# Patient Record
Sex: MALE | Race: WHITE | Employment: OTHER | URBAN - METROPOLITAN AREA
[De-identification: names, ages, dates, MRNs, and addresses within clinical notes are randomized per-mention and may not be internally consistent; named-entity substitution may affect disease eponyms.]

---

## 2020-10-09 RX ORDER — INSULIN LISPRO 100 [IU]/ML
5 INJECTION, SOLUTION INTRAVENOUS; SUBCUTANEOUS 2 TIMES DAILY PRN
COMMUNITY

## 2020-10-09 RX ORDER — ATORVASTATIN CALCIUM 40 MG/1
40 TABLET, FILM COATED ORAL NIGHTLY
COMMUNITY
Start: 2019-12-20

## 2020-10-09 RX ORDER — TRIAMTERENE AND HYDROCHLOROTHIAZIDE 37.5; 25 MG/1; MG/1
1 CAPSULE ORAL DAILY
COMMUNITY
Start: 2019-10-22

## 2020-10-09 RX ORDER — CALCIUM CARBONATE 500(1250)
1000 TABLET ORAL 3 TIMES DAILY
COMMUNITY

## 2020-10-09 RX ORDER — ASPIRIN 81 MG/1
81 TABLET, CHEWABLE ORAL DAILY
COMMUNITY

## 2020-10-09 RX ORDER — M-VIT,TX,IRON,MINS/CALC/FOLIC 27MG-0.4MG
1 TABLET ORAL DAILY
COMMUNITY

## 2020-10-09 RX ORDER — FAMOTIDINE 40 MG/1
40 TABLET, FILM COATED ORAL DAILY
COMMUNITY
Start: 2020-07-28

## 2020-10-09 RX ORDER — LISINOPRIL 10 MG/1
10 TABLET ORAL NIGHTLY
COMMUNITY

## 2020-10-09 RX ORDER — CETIRIZINE HCL 10 MG/1
10 CAPSULE ORAL DAILY
COMMUNITY

## 2020-10-09 RX ORDER — ALBUTEROL SULFATE 90 UG/1
2 AEROSOL, METERED RESPIRATORY (INHALATION) PRN
COMMUNITY

## 2020-10-09 SDOH — HEALTH STABILITY: MENTAL HEALTH: HOW OFTEN DO YOU HAVE A DRINK CONTAINING ALCOHOL?: NEVER

## 2020-10-10 ENCOUNTER — HOSPITAL ENCOUNTER (OUTPATIENT)
Dept: PREADMISSION TESTING | Age: 65
Setting detail: SPECIMEN
Discharge: HOME | End: 2020-10-14
Payer: MEDICARE

## 2020-10-10 PROCEDURE — U0003 INFECTIOUS AGENT DETECTION BY NUCLEIC ACID (DNA OR RNA); SEVERE ACUTE RESPIRATORY SYNDROME CORONAVIRUS 2 (SARS-COV-2) (CORONAVIRUS DISEASE [COVID-19]), AMPLIFIED PROBE TECHNIQUE, MAKING USE OF HIGH THROUGHPUT TECHNOLOGIES AS DESCRIBED BY CMS-2020-01-R: HCPCS

## 2020-10-12 LAB — SARS-COV-2, NAA: NOT DETECTED

## 2020-10-13 ENCOUNTER — TELEPHONE (OUTPATIENT)
Dept: PRIMARY CARE CLINIC | Age: 65
End: 2020-10-13

## 2020-10-14 ENCOUNTER — HOSPITAL ENCOUNTER (OUTPATIENT)
Age: 65
Setting detail: OUTPATIENT SURGERY
Discharge: HOME | End: 2020-10-14
Attending: OPHTHALMOLOGY | Admitting: OPHTHALMOLOGY
Payer: MEDICARE

## 2020-10-14 ENCOUNTER — ANESTHESIA (OUTPATIENT)
Dept: OPERATING ROOM | Age: 65
End: 2020-10-14
Payer: MEDICARE

## 2020-10-14 ENCOUNTER — ANESTHESIA EVENT (OUTPATIENT)
Dept: OPERATING ROOM | Age: 65
End: 2020-10-14
Payer: MEDICARE

## 2020-10-14 VITALS
HEIGHT: 71 IN | TEMPERATURE: 97.3 F | WEIGHT: 275.57 LBS | RESPIRATION RATE: 20 BRPM | BODY MASS INDEX: 38.58 KG/M2 | OXYGEN SATURATION: 94 % | SYSTOLIC BLOOD PRESSURE: 159 MMHG | HEART RATE: 66 BPM | DIASTOLIC BLOOD PRESSURE: 50 MMHG

## 2020-10-14 VITALS — OXYGEN SATURATION: 96 % | SYSTOLIC BLOOD PRESSURE: 179 MMHG | DIASTOLIC BLOOD PRESSURE: 80 MMHG

## 2020-10-14 LAB
GFR NON-AFRICAN AMERICAN: >60 ML/MIN
GFR SERPL CREATININE-BSD FRML MDRD: >60 ML/MIN
GFR SERPL CREATININE-BSD FRML MDRD: NORMAL ML/MIN/{1.73_M2}
GLUCOSE BLD-MCNC: 188 MG/DL (ref 74–100)
POC CREATININE: 0.77 MG/DL (ref 0.51–1.19)
POC POTASSIUM: 3.7 MMOL/L (ref 3.5–4.5)

## 2020-10-14 PROCEDURE — 6370000000 HC RX 637 (ALT 250 FOR IP): Performed by: OPHTHALMOLOGY

## 2020-10-14 PROCEDURE — 82565 ASSAY OF CREATININE: CPT

## 2020-10-14 PROCEDURE — 3700000000 HC ANESTHESIA ATTENDED CARE: Performed by: OPHTHALMOLOGY

## 2020-10-14 PROCEDURE — 2709999900 HC NON-CHARGEABLE SUPPLY: Performed by: OPHTHALMOLOGY

## 2020-10-14 PROCEDURE — 2580000003 HC RX 258: Performed by: ANESTHESIOLOGY

## 2020-10-14 PROCEDURE — 3600000004 HC SURGERY LEVEL 4 BASE: Performed by: OPHTHALMOLOGY

## 2020-10-14 PROCEDURE — 82947 ASSAY GLUCOSE BLOOD QUANT: CPT

## 2020-10-14 PROCEDURE — 3700000001 HC ADD 15 MINUTES (ANESTHESIA): Performed by: OPHTHALMOLOGY

## 2020-10-14 PROCEDURE — 3600000015 HC SURGERY LEVEL 5 ADDTL 15MIN: Performed by: OPHTHALMOLOGY

## 2020-10-14 PROCEDURE — 7100000041 HC SPAR PHASE II RECOVERY - ADDTL 15 MIN: Performed by: OPHTHALMOLOGY

## 2020-10-14 PROCEDURE — 84132 ASSAY OF SERUM POTASSIUM: CPT

## 2020-10-14 PROCEDURE — 3600000005 HC SURGERY LEVEL 5 BASE: Performed by: OPHTHALMOLOGY

## 2020-10-14 PROCEDURE — 7100000040 HC SPAR PHASE II RECOVERY - FIRST 15 MIN: Performed by: OPHTHALMOLOGY

## 2020-10-14 PROCEDURE — 3600000014 HC SURGERY LEVEL 4 ADDTL 15MIN: Performed by: OPHTHALMOLOGY

## 2020-10-14 PROCEDURE — 93005 ELECTROCARDIOGRAM TRACING: CPT | Performed by: ANESTHESIOLOGY

## 2020-10-14 RX ORDER — POLYMYXIN B SULFATE AND TRIMETHOPRIM 1; 10000 MG/ML; [USP'U]/ML
1 SOLUTION OPHTHALMIC
Status: COMPLETED | OUTPATIENT
Start: 2020-10-14 | End: 2020-10-14

## 2020-10-14 RX ORDER — TROPICAMIDE 10 MG/ML
1 SOLUTION/ DROPS OPHTHALMIC
Status: COMPLETED | OUTPATIENT
Start: 2020-10-14 | End: 2020-10-14

## 2020-10-14 RX ORDER — PHENYLEPHRINE HYDROCHLORIDE 100 MG/ML
1 SOLUTION/ DROPS OPHTHALMIC
Status: COMPLETED | OUTPATIENT
Start: 2020-10-14 | End: 2020-10-14

## 2020-10-14 RX ORDER — CYCLOPENTOLATE HYDROCHLORIDE 10 MG/ML
1 SOLUTION/ DROPS OPHTHALMIC
Status: COMPLETED | OUTPATIENT
Start: 2020-10-14 | End: 2020-10-14

## 2020-10-14 RX ORDER — SODIUM CHLORIDE, SODIUM LACTATE, POTASSIUM CHLORIDE, CALCIUM CHLORIDE 600; 310; 30; 20 MG/100ML; MG/100ML; MG/100ML; MG/100ML
INJECTION, SOLUTION INTRAVENOUS CONTINUOUS
Status: DISCONTINUED | OUTPATIENT
Start: 2020-10-14 | End: 2020-10-14 | Stop reason: HOSPADM

## 2020-10-14 RX ADMIN — TROPICAMIDE 1 DROP: 10 SOLUTION/ DROPS OPHTHALMIC at 11:50

## 2020-10-14 RX ADMIN — POLYMYXIN B SULFATE, TRIMETHOPRIM SULFATE 1 DROP: 10000; 1 SOLUTION/ DROPS OPHTHALMIC at 12:21

## 2020-10-14 RX ADMIN — PHENYLEPHRINE HYDROCHLORIDE 1 DROP: 100 SOLUTION/ DROPS OPHTHALMIC at 11:50

## 2020-10-14 RX ADMIN — TROPICAMIDE 1 DROP: 10 SOLUTION/ DROPS OPHTHALMIC at 11:35

## 2020-10-14 RX ADMIN — CYCLOPENTOLATE HYDROCHLORIDE 1 DROP: 10 SOLUTION/ DROPS OPHTHALMIC at 11:35

## 2020-10-14 RX ADMIN — PHENYLEPHRINE HYDROCHLORIDE 1 DROP: 100 SOLUTION/ DROPS OPHTHALMIC at 11:35

## 2020-10-14 RX ADMIN — POLYMYXIN B SULFATE, TRIMETHOPRIM SULFATE 1 DROP: 10000; 1 SOLUTION/ DROPS OPHTHALMIC at 11:50

## 2020-10-14 RX ADMIN — TROPICAMIDE 1 DROP: 10 SOLUTION/ DROPS OPHTHALMIC at 12:22

## 2020-10-14 RX ADMIN — CYCLOPENTOLATE HYDROCHLORIDE 1 DROP: 10 SOLUTION/ DROPS OPHTHALMIC at 12:21

## 2020-10-14 RX ADMIN — POLYMYXIN B SULFATE, TRIMETHOPRIM SULFATE 1 DROP: 10000; 1 SOLUTION/ DROPS OPHTHALMIC at 12:05

## 2020-10-14 RX ADMIN — SODIUM CHLORIDE, POTASSIUM CHLORIDE, SODIUM LACTATE AND CALCIUM CHLORIDE: 600; 310; 30; 20 INJECTION, SOLUTION INTRAVENOUS at 11:45

## 2020-10-14 RX ADMIN — CYCLOPENTOLATE HYDROCHLORIDE 1 DROP: 10 SOLUTION/ DROPS OPHTHALMIC at 11:50

## 2020-10-14 RX ADMIN — PHENYLEPHRINE HYDROCHLORIDE 1 DROP: 100 SOLUTION/ DROPS OPHTHALMIC at 12:05

## 2020-10-14 RX ADMIN — CYCLOPENTOLATE HYDROCHLORIDE 1 DROP: 10 SOLUTION/ DROPS OPHTHALMIC at 12:05

## 2020-10-14 RX ADMIN — TROPICAMIDE 1 DROP: 10 SOLUTION/ DROPS OPHTHALMIC at 12:05

## 2020-10-14 RX ADMIN — POLYMYXIN B SULFATE, TRIMETHOPRIM SULFATE 1 DROP: 10000; 1 SOLUTION/ DROPS OPHTHALMIC at 11:35

## 2020-10-14 ASSESSMENT — PAIN - FUNCTIONAL ASSESSMENT: PAIN_FUNCTIONAL_ASSESSMENT: 0-10

## 2020-10-14 ASSESSMENT — PULMONARY FUNCTION TESTS
PIF_VALUE: 0
PIF_VALUE: 0

## 2020-10-14 ASSESSMENT — PAIN SCALES - GENERAL: PAINLEVEL_OUTOF10: 0

## 2020-10-14 NOTE — PROGRESS NOTES
Discharge instructions to pt/wife -voices understanding /patient states will try to follow up with cardiology in [facility] -if unable will follow up in [facility] office number given

## 2020-10-14 NOTE — H&P
History and Physical    Pt Name: Mabel Ponce  MRN: [id number]  YOB: 1955  Date of evaluation: 10/14/2020  Primary Care Physician: Zulema Stein    SUBJECTIVE:   History of Chief Complaint:    Mabel Ponce is a 76 y.o. male who is scheduled today for left vitrectomy, triescence injection. He complains of left decreased vision for the past several months. He is s/p left cataract removal in august 2020. He reports a longstanding history of DM2, over 30 years. He reports having a retinal detachment. Allergies  is allergic to penicillins; adhesive tape; and seasonal.  Medications  Prior to Admission medications    Medication Sig Start Date End Date Taking?  Authorizing Provider   Cetirizine HCl (ALLERGY RELIEF) 10 MG CAPS Take 10 mg by mouth daily    Yes Historical Provider, MD   aspirin 81 MG chewable tablet Take 81 mg by mouth daily   Yes Historical Provider, MD   insulin lispro (HUMALOG) 100 UNIT/ML injection cartridge Inject 5 Units into the skin 2 times daily as needed for Other For blood sugar greater than 250   Yes Historical Provider, MD   lisinopril (PRINIVIL;ZESTRIL) 10 MG tablet Take 10 mg by mouth nightly   Yes Historical Provider, MD   metFORMIN (GLUCOPHAGE) 500 MG tablet Take 500 mg by mouth daily   Yes Historical Provider, MD   albuterol sulfate  (90 Base) MCG/ACT inhaler Inhale 2 actuation into the lungs as needed   Yes Historical Provider, MD   atorvastatin (LIPITOR) 40 MG tablet Take 40 mg by mouth nightly 12/20/19  Yes Historical Provider, MD   famotidine (PEPCID) 40 MG tablet Take 40 mg by mouth daily 7/28/20  Yes Historical Provider, MD   Insulin Degludec 200 UNIT/ML SOPN Inject 66 Units into the skin daily 11/20/19  Yes Historical Provider, MD   triamterene-hydroCHLOROthiazide (DYAZIDE) 37.5-25 MG per capsule Take 1 capsule by mouth daily 10/22/19  Yes Historical Provider, MD   VITAMIN A PO Take 1,000 mg by mouth daily   Yes Historical Provider, MD   calcium carbonate (OSCAL) 500 MG TABS tablet Take 1,000 mg by mouth 3 times daily   Yes Historical Provider, MD   Multiple Vitamins-Minerals (THERAPEUTIC MULTIVITAMIN-MINERALS) tablet Take 1 tablet by mouth daily   Yes Historical Provider, MD   Cyanocobalamin (VITAMIN B 12 PO) Take 500 mg by mouth three times a week    Historical Provider, MD   VITAMIN D PO Take 400 Units by mouth daily    Historical Provider, MD     Past Medical History    has a past medical history of Asthma, CAD (coronary artery disease), Diabetes mellitus ([facility] [address] GERD (gastroesophageal reflux disease), [place] (hard of hearing), Hyperlipidemia, Hypertension, Neuropathy, PVD (peripheral vascular disease) ([facility] [address] Sleep apnea, Vision problem, and Wellness examination. Past Surgical History   has a past surgical history that includes Gastric bypass surgery (? ); Coronary artery bypass graft (); Colonoscopy; and Cataract removal with implant (Left, 2020). Social History   reports that he has never smoked. He has never used smokeless tobacco.   reports no history of alcohol use. reports no history of drug use. Marital Status   Children   Occupation GM  Family History  Family Status   Relation Name Status    Mother     Shannon Williamson Father       family history includes Diabetes in his father and mother; Heart Disease in his father and mother. OBJECTIVE:   VITALS:  height is 5' 11\" (1.803 m) and weight is 275 lb 9.2 oz (125 kg). His temporal temperature is 97.9 °F (36.6 °C). His blood pressure is 168/73 (abnormal) and his pulse is 62. His respiration is 20 and oxygen saturation is 95%. CONSTITUTIONAL:Alert and orientated to person, place and time. No acute distress. Friendly. Quiet affect, somewhat of a vague historian.   SKIN:  Warm & dry, no rashes on exposed skin  HEENT: HEAD: Normocephalic, atraumatic        EYES:  PERRL, EOMs intact, conjunctiva clear, wearing glasses       EARS:  Equal bilaterally, no edema or thickening, skin is intact without lumps or lesions. No discharge. Hearing loss mild. NOSE:  Nares patent, septum midline, no rhinorrhea      MOUTH/THROAT:  Mucous membranes moist, tongue is pink, uvula midline, teeth appear to be intact, large tongue  NECK:  Supple, no lymphadenopathy, full ROM  LUNGS: Respirations even and non-labored. Diminished throughout  CARDIOVASCULAR: slight irregularity, possible soft murmur. ABDOMEN: soft, non-tender, non-distended, bowel sounds active x 4, obese  MUSCULOSKELETAL: Full ROM bilateral upper extremities, Full ROM bilateral lower extremities. VASCULAR:  Brisk cap refill bilateral fingers. Radial pulses are intact, 2+ bilaterally. Dorsalis pedis pulse 2+ bilaterally. No edema or varicosities bilateral lower extremities but noted skin discoloration (PVD)  NEUROLOGIC: CN II-XII are grossly intact. Gait not assessed. IMPRESSIONS:   LEFT EYE DIABETIC TRACTION RETINAL DETACHMENT      Diagnosis Date    Asthma     CAD (coronary artery disease)     CABG 2011, does not follow with cardiology    Diabetes mellitus ([address]     GERD (gastroesophageal reflux disease)     [place] (hard of hearing)     has Bilat hearing aids but does not ususally wear them    Hyperlipidemia     Hypertension     Neuropathy     feet    PVD (peripheral vascular disease) ([address]     Sleep apnea     uses cpap    Vision problem     left eye    Wellness examination     PCP Halle Cartwright CNP in [place] manages everything   1.    PLANS:   Vitrectomy- OS    MAGO  BLAND APRN-CNP  Electronically signed 10/14/2020 at 11:42 AM

## 2020-10-14 NOTE — CONSULTS
[facility]   Consultation Note               Today's Date: 10/14/2020  Patient Name: Cecilia Loja  Date of admission: 10/14/2020 10:08 AM  Patient's age: 76 y.o., 1955  Admission Dx: LEFT EYE DIABETIC TRACTION RETINAL DETACHMENT    Reason for Consult:  Bigeminy, cancelled eye surgery    Requesting Physician: Dary Lang MD    CHIEF COMPLAINT:  No chief complaint on file. History Obtained From:  patient, electronic medical record    HISTORY OF PRESENT ILLNESS:      The patient is a 76 y.o.  male who presented for outpatient eye surgery for   left vitrectomy, however was cancelled due to concerns with bigeminy and PVCs on the monitor. Cardiology consulted for discharge clearance. Patient currently is asymptomatic. No recent symptoms of chest pain/pressure, shortness of breath, dizziness, syncope, palpitations, or orthopnea. He has a hx of CAD s/p CABGx3 ~ 10 years ago at [facility]. He does not follow up with any cardiologists. Not on BB at home. No prior known hx of bradycardia. No recent echo/stress test/cath per the patient. He is not very active at baseline. Is able to climb one flight of stairs without breaks. Past Medical History:   has a past medical history of Asthma, CAD (coronary artery disease), Diabetes mellitus ([address] GERD (gastroesophageal reflux disease), [place] (hard of hearing), Hyperlipidemia, Hypertension, Neuropathy, PVD (peripheral vascular disease) ([address] Sleep apnea, Vision problem, and Wellness examination. Past Surgical History:   has a past surgical history that includes Gastric bypass surgery (? 2010); Coronary artery bypass graft (2011); Colonoscopy; and Cataract removal with implant (Left, 08/26/2020). Home Medications:    Prior to Admission medications    Medication Sig Start Date End Date Taking?  Authorizing Provider   Cetirizine HCl (ALLERGY RELIEF) 10 MG CAPS Take 10 mg by mouth daily    Yes Historical Provider, MD aspirin 81 MG chewable tablet Take 81 mg by mouth daily   Yes Historical Provider, MD   insulin lispro (HUMALOG) 100 UNIT/ML injection cartridge Inject 5 Units into the skin 2 times daily as needed for Other For blood sugar greater than 250   Yes Historical Provider, MD   lisinopril (PRINIVIL;ZESTRIL) 10 MG tablet Take 10 mg by mouth nightly   Yes Historical Provider, MD   metFORMIN (GLUCOPHAGE) 500 MG tablet Take 500 mg by mouth daily   Yes Historical Provider, MD   albuterol sulfate  (90 Base) MCG/ACT inhaler Inhale 2 actuation into the lungs as needed   Yes Historical Provider, MD   atorvastatin (LIPITOR) 40 MG tablet Take 40 mg by mouth nightly 12/20/19  Yes Historical Provider, MD   famotidine (PEPCID) 40 MG tablet Take 40 mg by mouth daily 7/28/20  Yes Historical Provider, MD   Insulin Degludec 200 UNIT/ML SOPN Inject 66 Units into the skin daily 11/20/19  Yes Historical Provider, MD   triamterene-hydroCHLOROthiazide (DYAZIDE) 37.5-25 MG per capsule Take 1 capsule by mouth daily 10/22/19  Yes Historical Provider, MD   VITAMIN A PO Take 1,000 mg by mouth daily   Yes Historical Provider, MD   calcium carbonate (OSCAL) 500 MG TABS tablet Take 1,000 mg by mouth 3 times daily   Yes Historical Provider, MD   Multiple Vitamins-Minerals (THERAPEUTIC MULTIVITAMIN-MINERALS) tablet Take 1 tablet by mouth daily   Yes Historical Provider, MD   Cyanocobalamin (VITAMIN B 12 PO) Take 500 mg by mouth three times a week    Historical Provider, MD   VITAMIN D PO Take 400 Units by mouth daily    Historical Provider, MD      Current Facility-Administered Medications: lactated ringers infusion, , Intravenous, Continuous      Allergies:  Penicillins; Adhesive tape; and Seasonal      Social History:   reports that he has never smoked. He has never used smokeless tobacco. He reports that he does not drink alcohol or use drugs.        Family History: family history includes Diabetes in his father and mother; Heart Disease in his father and mother. No h/o sudden cardiac death. No for premature CAD      REVIEW OF SYSTEMS:    · Constitutional: there has been no unanticipated weight loss. · Eyes: No visual changes or diplopia. · ENT: No Headaches  · Cardiovascular: see above  · Respiratory: No previous pulmonary problems, No cough  · Gastrointestinal: No abdominal pain. No change in bowel or bladder habits. · Genitourinary: No dysuria, trouble voiding, or hematuria. · Musculoskeletal:  No gait disturbance, No weakness or joint complaints. · Integumentary: No rash or pruritis. · Neurological: No headache, diplopia      PHYSICAL EXAM:      BP (!) 159/50   Pulse 66   Temp 97.3 °F (36.3 °C) (Temporal)   Resp 20   Ht 5' 11\" (1.803 m)   Wt 275 lb 9.2 oz (125 kg)   SpO2 94%   BMI 38.43 kg/m²    Constitutional and General Appearance: Alert, no distress  Respiratory:  · No increased work of breathing. · Clear to auscultation bilaterally. No wheeze or crackles. Cardiovascular:  · Normal S1 and S2.   · Jugular venous pulsation Normal  Abdomen:   · Soft  · No tenderness  Extremities:  · Chronic skin changes, no significant edema  Neurologic:  · Alert and oriented. · Moves all extremities well      DATA:    Diagnostics:    Labs:       BMP:   Recent Labs     10/14/20  1141   CREATININE 0.77   LABGLOM >60         EKG: sinus rhythm with PVCs and bigeminy    IMPRESSION:    1. Frequent PVCs and intermittent bigeminy  2. CAD s./p CABG x3 - 10 years ago, unknown grafts  3. HLD  4. HTN  5. DM  6. Diabetic retinopathy  7. Peripheral neuropathy  8. PVD  9. KATLYN    RECOMMENDATIONS:  1. No active signs or symptoms of angina or CHF. 2. No recent cardiology follow up. 3. Patient is not sure if he is taking any beta blockers (not on his current list but appears on his PCPs office note). 4. Okay to discharge patient if remains stable hemodynamically. 5. Follow up in the office for formal clearance prior to procedures.  He lives in [place] but will decide if he wants to follow up here or back home. Office number provided. Thank you for allowing us to participate in [facility]. Electronically signed on 10/14/20 at 3:07 PM by:    Vaishali Mazariegos MD   Fellow, [address]    Attending Physician Statement  I have discussed the case of Terry Moura including pertinent history and exam findings with the resident. I agree with the assessment, plan and orders as documented by the resident With changes made to the note.      Electronically signed by Arley Amaral MD on 10/14/2020 at 3:48 PM.    [facility]      [phone]

## 2020-10-14 NOTE — ANESTHESIA PRE PROCEDURE
Department of Anesthesiology  Preprocedure Note       Name:  Reese Pitts   Age:  76 y.o.  :  1955                                          MRN:  [id number]         Date:  10/14/2020      Surgeon: Al Thomas):  Dayanara Tripp MD    Procedure: Procedure(s):  PARS PLANA VITRECTOMY 25 GAUGE, MEMBRANE PEEL, ENDOLASER, TRIESENCE INJECTION    Medications prior to admission:   Prior to Admission medications    Medication Sig Start Date End Date Taking?  Authorizing Provider   Cetirizine HCl (ALLERGY RELIEF) 10 MG CAPS Take 10 mg by mouth daily    Yes Historical Provider, MD   aspirin 81 MG chewable tablet Take 81 mg by mouth daily   Yes Historical Provider, MD   insulin lispro (HUMALOG) 100 UNIT/ML injection cartridge Inject 5 Units into the skin 2 times daily as needed for Other For blood sugar greater than 250   Yes Historical Provider, MD   lisinopril (PRINIVIL;ZESTRIL) 10 MG tablet Take 10 mg by mouth nightly   Yes Historical Provider, MD   metFORMIN (GLUCOPHAGE) 500 MG tablet Take 500 mg by mouth daily   Yes Historical Provider, MD   albuterol sulfate  (90 Base) MCG/ACT inhaler Inhale 2 actuation into the lungs as needed   Yes Historical Provider, MD   atorvastatin (LIPITOR) 40 MG tablet Take 40 mg by mouth nightly 19  Yes Historical Provider, MD   famotidine (PEPCID) 40 MG tablet Take 40 mg by mouth daily 20  Yes Historical Provider, MD   Insulin Degludec 200 UNIT/ML SOPN Inject 66 Units into the skin daily 19  Yes Historical Provider, MD   triamterene-hydroCHLOROthiazide (DYAZIDE) 37.5-25 MG per capsule Take 1 capsule by mouth daily 10/22/19  Yes Historical Provider, MD   VITAMIN A PO Take 1,000 mg by mouth daily   Yes Historical Provider, MD   calcium carbonate (OSCAL) 500 MG TABS tablet Take 1,000 mg by mouth 3 times daily   Yes Historical Provider, MD   Multiple Vitamins-Minerals (THERAPEUTIC MULTIVITAMIN-MINERALS) tablet Take 1 tablet by mouth daily   Yes Historical Provider, MD Smokeless tobacco: Never Used   Substance Use Topics    Alcohol use: Never     Frequency: Never                                Counseling given: Not Answered      Vital Signs (Current):   Vitals:    10/09/20 1048 10/14/20 1112   Weight: 275 lb (124.7 kg) 275 lb 9.2 oz (125 kg)   Height: 6' (1.829 m) 5' 11\" (1.803 m)                                              BP Readings from Last 3 Encounters:   No data found for BP       NPO Status:                                                                                 BMI:   Wt Readings from Last 3 Encounters:   10/14/20 275 lb 9.2 oz (125 kg)     Body mass index is 38.43 kg/m². CBC: No results found for: WBC, RBC, HGB, HCT, MCV, RDW, PLT    CMP: No results found for: NA, K, CL, CO2, BUN, CREATININE, GFRAA, AGRATIO, LABGLOM, GLUCOSE, PROT, CALCIUM, BILITOT, ALKPHOS, AST, ALT    POC Tests: No results for input(s): POCGLU, POCNA, POCK, POCCL, POCBUN, POCHEMO, POCHCT in the last 72 hours.     Coags: No results found for: PROTIME, INR, APTT    HCG (If Applicable): No results found for: PREGTESTUR, PREGSERUM, HCG, HCGQUANT     ABGs: No results found for: PHART, PO2ART, BBI5RPH, ZMI3BHV, BEART, A3RGJTDI     Type & Screen (If Applicable):  No results found for: LABABO, LABRH    Drug/Infectious Status (If Applicable):  No results found for: HIV, HEPCAB    COVID-19 Screening (If Applicable):   Lab Results   Component Value Date    COVID19 Not Detected 10/10/2020         Anesthesia Evaluation  Patient summary reviewed no history of anesthetic complications:   Airway: Mallampati: II        Dental:          Pulmonary:normal exam  breath sounds clear to auscultation  (+) sleep apnea: on CPAP,  asthma:                            Cardiovascular:    (+) hypertension:, CAD:, CABG/stent:,         Rhythm: regular  Rate: normal                    Neuro/Psych:   Negative Neuro/Psych ROS              GI/Hepatic/Renal:   (+) GERD:,           Endo/Other:    (+) DiabetesType II DM, , .                 Abdominal:           Vascular: negative vascular ROS. Anesthesia Plan      MAC     ASA 3       Induction: intravenous. Anesthetic plan and risks discussed with patient. Plan discussed with CRNA.                   Annetta Leos MD   10/14/2020

## 2020-10-14 NOTE — PROGRESS NOTES
Dr Francie Carter notified of K -3.7 /blood sugar 188/also notified of vpb's in couplet's at times and bigeminey- as on 12 lead preop in august 2020-no orders

## 2020-10-15 LAB
EKG ATRIAL RATE: 61 BPM
EKG P AXIS: 56 DEGREES
EKG P-R INTERVAL: 204 MS
EKG Q-T INTERVAL: 440 MS
EKG QRS DURATION: 116 MS
EKG QTC CALCULATION (BAZETT): 442 MS
EKG R AXIS: -39 DEGREES
EKG T AXIS: 11 DEGREES
EKG VENTRICULAR RATE: 61 BPM

## 2021-09-22 ENCOUNTER — ANESTHESIA EVENT (OUTPATIENT)
Dept: OPERATING ROOM | Age: 66
End: 2021-09-22
Payer: MEDICARE

## 2021-09-22 ENCOUNTER — HOSPITAL ENCOUNTER (OUTPATIENT)
Age: 66
Setting detail: OUTPATIENT SURGERY
Discharge: HOME | End: 2021-09-22
Attending: OPHTHALMOLOGY | Admitting: OPHTHALMOLOGY
Payer: MEDICARE

## 2021-09-22 ENCOUNTER — ANESTHESIA (OUTPATIENT)
Dept: OPERATING ROOM | Age: 66
End: 2021-09-22
Payer: MEDICARE

## 2021-09-22 VITALS
HEART RATE: 70 BPM | WEIGHT: 265 LBS | HEIGHT: 72 IN | DIASTOLIC BLOOD PRESSURE: 50 MMHG | BODY MASS INDEX: 35.89 KG/M2 | TEMPERATURE: 96.8 F | SYSTOLIC BLOOD PRESSURE: 136 MMHG | OXYGEN SATURATION: 98 % | RESPIRATION RATE: 16 BRPM

## 2021-09-22 VITALS — SYSTOLIC BLOOD PRESSURE: 140 MMHG | OXYGEN SATURATION: 100 % | DIASTOLIC BLOOD PRESSURE: 63 MMHG

## 2021-09-22 LAB
GFR NON-AFRICAN AMERICAN: >60 ML/MIN
GFR SERPL CREATININE-BSD FRML MDRD: >60 ML/MIN
GFR SERPL CREATININE-BSD FRML MDRD: NORMAL ML/MIN/{1.73_M2}
GLUCOSE BLD-MCNC: 169 MG/DL (ref 75–110)
GLUCOSE BLD-MCNC: 69 MG/DL (ref 75–110)
GLUCOSE BLD-MCNC: 91 MG/DL (ref 74–100)
POC CREATININE: 1.07 MG/DL (ref 0.51–1.19)
POC POTASSIUM: 5.2 MMOL/L (ref 3.5–4.5)

## 2021-09-22 PROCEDURE — 3700000001 HC ADD 15 MINUTES (ANESTHESIA): Performed by: OPHTHALMOLOGY

## 2021-09-22 PROCEDURE — 7100000041 HC SPAR PHASE II RECOVERY - ADDTL 15 MIN: Performed by: OPHTHALMOLOGY

## 2021-09-22 PROCEDURE — 3600000004 HC SURGERY LEVEL 4 BASE: Performed by: OPHTHALMOLOGY

## 2021-09-22 PROCEDURE — 6370000000 HC RX 637 (ALT 250 FOR IP): Performed by: OPHTHALMOLOGY

## 2021-09-22 PROCEDURE — 2709999900 HC NON-CHARGEABLE SUPPLY: Performed by: OPHTHALMOLOGY

## 2021-09-22 PROCEDURE — 6360000002 HC RX W HCPCS: Performed by: NURSE ANESTHETIST, CERTIFIED REGISTERED

## 2021-09-22 PROCEDURE — 82947 ASSAY GLUCOSE BLOOD QUANT: CPT

## 2021-09-22 PROCEDURE — 82565 ASSAY OF CREATININE: CPT

## 2021-09-22 PROCEDURE — 6360000002 HC RX W HCPCS: Performed by: OPHTHALMOLOGY

## 2021-09-22 PROCEDURE — 2580000003 HC RX 258: Performed by: OPHTHALMOLOGY

## 2021-09-22 PROCEDURE — 3700000000 HC ANESTHESIA ATTENDED CARE: Performed by: OPHTHALMOLOGY

## 2021-09-22 PROCEDURE — 93005 ELECTROCARDIOGRAM TRACING: CPT | Performed by: ANESTHESIOLOGY

## 2021-09-22 PROCEDURE — 84132 ASSAY OF SERUM POTASSIUM: CPT

## 2021-09-22 PROCEDURE — 2500000003 HC RX 250 WO HCPCS: Performed by: NURSE ANESTHETIST, CERTIFIED REGISTERED

## 2021-09-22 PROCEDURE — 3600000014 HC SURGERY LEVEL 4 ADDTL 15MIN: Performed by: OPHTHALMOLOGY

## 2021-09-22 PROCEDURE — 2720000010 HC SURG SUPPLY STERILE: Performed by: OPHTHALMOLOGY

## 2021-09-22 PROCEDURE — 7100000040 HC SPAR PHASE II RECOVERY - FIRST 15 MIN: Performed by: OPHTHALMOLOGY

## 2021-09-22 PROCEDURE — 2500000003 HC RX 250 WO HCPCS: Performed by: OPHTHALMOLOGY

## 2021-09-22 RX ORDER — DEXTROSE MONOHYDRATE 50 MG/ML
INJECTION, SOLUTION INTRAVENOUS CONTINUOUS PRN
Status: COMPLETED | OUTPATIENT
Start: 2021-09-22 | End: 2021-09-22

## 2021-09-22 RX ORDER — CYCLOPENTOLATE HYDROCHLORIDE 10 MG/ML
SOLUTION/ DROPS OPHTHALMIC PRN
Status: DISCONTINUED | OUTPATIENT
Start: 2021-09-22 | End: 2021-09-22 | Stop reason: ALTCHOICE

## 2021-09-22 RX ORDER — DIPHENHYDRAMINE HYDROCHLORIDE 50 MG/ML
12.5 INJECTION INTRAMUSCULAR; INTRAVENOUS
Status: DISCONTINUED | OUTPATIENT
Start: 2021-09-22 | End: 2021-09-22 | Stop reason: HOSPADM

## 2021-09-22 RX ORDER — BALANCED SALT SOLUTION ENRICHED WITH BICARBONATE, DEXTROSE, AND GLUTATHIONE
KIT INTRAOCULAR PRN
Status: DISCONTINUED | OUTPATIENT
Start: 2021-09-22 | End: 2021-09-22 | Stop reason: ALTCHOICE

## 2021-09-22 RX ORDER — TROPICAMIDE 10 MG/ML
1 SOLUTION/ DROPS OPHTHALMIC
Status: COMPLETED | OUTPATIENT
Start: 2021-09-22 | End: 2021-09-22

## 2021-09-22 RX ORDER — SODIUM CHLORIDE, SODIUM LACTATE, POTASSIUM CHLORIDE, CALCIUM CHLORIDE 600; 310; 30; 20 MG/100ML; MG/100ML; MG/100ML; MG/100ML
INJECTION, SOLUTION INTRAVENOUS CONTINUOUS
Status: DISCONTINUED | OUTPATIENT
Start: 2021-09-22 | End: 2021-09-22 | Stop reason: HOSPADM

## 2021-09-22 RX ORDER — HYDROCODONE BITARTRATE AND ACETAMINOPHEN 5; 325 MG/1; MG/1
1 TABLET ORAL
Status: DISCONTINUED | OUTPATIENT
Start: 2021-09-22 | End: 2021-09-22 | Stop reason: HOSPADM

## 2021-09-22 RX ORDER — NEOMYCIN SULFATE, POLYMYXIN B SULFATE, AND DEXAMETHASONE 3.5; 10000; 1 MG/G; [USP'U]/G; MG/G
OINTMENT OPHTHALMIC PRN
Status: DISCONTINUED | OUTPATIENT
Start: 2021-09-22 | End: 2021-09-22 | Stop reason: ALTCHOICE

## 2021-09-22 RX ORDER — METOCLOPRAMIDE HYDROCHLORIDE 5 MG/ML
10 INJECTION INTRAMUSCULAR; INTRAVENOUS
Status: DISCONTINUED | OUTPATIENT
Start: 2021-09-22 | End: 2021-09-22 | Stop reason: HOSPADM

## 2021-09-22 RX ORDER — PHENYLEPHRINE HYDROCHLORIDE 100 MG/ML
1 SOLUTION/ DROPS OPHTHALMIC
Status: COMPLETED | OUTPATIENT
Start: 2021-09-22 | End: 2021-09-22

## 2021-09-22 RX ORDER — HYDRALAZINE HYDROCHLORIDE 20 MG/ML
5 INJECTION INTRAMUSCULAR; INTRAVENOUS EVERY 10 MIN PRN
Status: DISCONTINUED | OUTPATIENT
Start: 2021-09-22 | End: 2021-09-22 | Stop reason: HOSPADM

## 2021-09-22 RX ORDER — OFLOXACIN 3 MG/ML
1 SOLUTION/ DROPS OPHTHALMIC
Status: COMPLETED | OUTPATIENT
Start: 2021-09-22 | End: 2021-09-22

## 2021-09-22 RX ORDER — TRIAMCINOLONE ACETONIDE 40 MG/ML
INJECTION, SUSPENSION INTRA-ARTICULAR; INTRAMUSCULAR PRN
Status: DISCONTINUED | OUTPATIENT
Start: 2021-09-22 | End: 2021-09-22 | Stop reason: ALTCHOICE

## 2021-09-22 RX ORDER — PROMETHAZINE HYDROCHLORIDE 25 MG/ML
6.25 INJECTION, SOLUTION INTRAMUSCULAR; INTRAVENOUS
Status: DISCONTINUED | OUTPATIENT
Start: 2021-09-22 | End: 2021-09-22 | Stop reason: HOSPADM

## 2021-09-22 RX ORDER — MEPERIDINE HYDROCHLORIDE 50 MG/ML
12.5 INJECTION INTRAMUSCULAR; INTRAVENOUS; SUBCUTANEOUS EVERY 5 MIN PRN
Status: DISCONTINUED | OUTPATIENT
Start: 2021-09-22 | End: 2021-09-22 | Stop reason: HOSPADM

## 2021-09-22 RX ORDER — CYCLOPENTOLATE HYDROCHLORIDE 10 MG/ML
1 SOLUTION/ DROPS OPHTHALMIC
Status: COMPLETED | OUTPATIENT
Start: 2021-09-22 | End: 2021-09-22

## 2021-09-22 RX ORDER — PROPOFOL 10 MG/ML
INJECTION, EMULSION INTRAVENOUS PRN
Status: DISCONTINUED | OUTPATIENT
Start: 2021-09-22 | End: 2021-09-22 | Stop reason: SDUPTHER

## 2021-09-22 RX ORDER — LIDOCAINE HYDROCHLORIDE 10 MG/ML
INJECTION, SOLUTION EPIDURAL; INFILTRATION; INTRACAUDAL; PERINEURAL PRN
Status: DISCONTINUED | OUTPATIENT
Start: 2021-09-22 | End: 2021-09-22 | Stop reason: SDUPTHER

## 2021-09-22 RX ORDER — INDOCYANINE GREEN AND WATER 25 MG
KIT INJECTION PRN
Status: DISCONTINUED | OUTPATIENT
Start: 2021-09-22 | End: 2021-09-22 | Stop reason: ALTCHOICE

## 2021-09-22 RX ADMIN — LIDOCAINE HYDROCHLORIDE 50 MG: 10 INJECTION, SOLUTION EPIDURAL; INFILTRATION; INTRACAUDAL; PERINEURAL at 09:56

## 2021-09-22 RX ADMIN — OFLOXACIN 1 DROP: 3 SOLUTION OPHTHALMIC at 09:16

## 2021-09-22 RX ADMIN — PHENYLEPHRINE HYDROCHLORIDE 1 DROP: 100 SOLUTION/ DROPS OPHTHALMIC at 09:01

## 2021-09-22 RX ADMIN — PROPOFOL 50 MG: 10 INJECTION, EMULSION INTRAVENOUS at 09:57

## 2021-09-22 RX ADMIN — LIDOCAINE HYDROCHLORIDE 30 MG: 10 INJECTION, SOLUTION EPIDURAL; INFILTRATION; INTRACAUDAL; PERINEURAL at 11:08

## 2021-09-22 RX ADMIN — TROPICAMIDE 1 DROP: 10 SOLUTION/ DROPS OPHTHALMIC at 09:45

## 2021-09-22 RX ADMIN — PHENYLEPHRINE HYDROCHLORIDE 1 DROP: 100 SOLUTION/ DROPS OPHTHALMIC at 09:35

## 2021-09-22 RX ADMIN — TROPICAMIDE 1 DROP: 10 SOLUTION/ DROPS OPHTHALMIC at 09:01

## 2021-09-22 RX ADMIN — TROPICAMIDE 1 DROP: 10 SOLUTION/ DROPS OPHTHALMIC at 09:16

## 2021-09-22 RX ADMIN — PHENYLEPHRINE HYDROCHLORIDE 1 DROP: 100 SOLUTION/ DROPS OPHTHALMIC at 09:16

## 2021-09-22 RX ADMIN — CYCLOPENTOLATE HYDROCHLORIDE 1 DROP: 10 SOLUTION OPHTHALMIC at 09:36

## 2021-09-22 RX ADMIN — CYCLOPENTOLATE HYDROCHLORIDE 1 DROP: 10 SOLUTION OPHTHALMIC at 09:01

## 2021-09-22 RX ADMIN — CYCLOPENTOLATE HYDROCHLORIDE 1 DROP: 10 SOLUTION OPHTHALMIC at 09:16

## 2021-09-22 RX ADMIN — OFLOXACIN 1 DROP: 3 SOLUTION OPHTHALMIC at 09:45

## 2021-09-22 RX ADMIN — LIDOCAINE HYDROCHLORIDE 20 MG: 10 INJECTION, SOLUTION EPIDURAL; INFILTRATION; INTRACAUDAL; PERINEURAL at 11:14

## 2021-09-22 RX ADMIN — CYCLOPENTOLATE HYDROCHLORIDE 1 DROP: 10 SOLUTION OPHTHALMIC at 09:45

## 2021-09-22 RX ADMIN — OFLOXACIN 1 DROP: 3 SOLUTION OPHTHALMIC at 09:36

## 2021-09-22 RX ADMIN — SODIUM CHLORIDE, POTASSIUM CHLORIDE, SODIUM LACTATE AND CALCIUM CHLORIDE: 600; 310; 30; 20 INJECTION, SOLUTION INTRAVENOUS at 09:16

## 2021-09-22 RX ADMIN — TROPICAMIDE 1 DROP: 10 SOLUTION/ DROPS OPHTHALMIC at 09:36

## 2021-09-22 RX ADMIN — OFLOXACIN 1 DROP: 3 SOLUTION OPHTHALMIC at 09:01

## 2021-09-22 ASSESSMENT — PAIN SCALES - GENERAL
PAINLEVEL_OUTOF10: 0

## 2021-09-22 ASSESSMENT — PAIN DESCRIPTION - PAIN TYPE: TYPE: SURGICAL PAIN

## 2021-09-22 ASSESSMENT — PAIN - FUNCTIONAL ASSESSMENT: PAIN_FUNCTIONAL_ASSESSMENT: 0-10

## 2021-09-22 NOTE — H&P
History and Physical    Pt Name: Michael Diaz  MRN: [id number]  YOB: 1955  Date of evaluation: 9/22/2021  Primary Care Physician: Niels Phoenix    SUBJECTIVE:   History of Chief Complaint:    Michael Diaz is a 78 y.o. male who is scheduled today for PARS PLANA VITRECTOMY 25 GAUGE, MEMBRANE PEEL, ENDOLASER, KENALOG INJECTION, ICG - Left. Patient reports complaints of decreased blurry vision for several months. Hx DM2, CAD. He was previously scheduled for this surgery last autumn (Oct 2020), but postponed. Patient reports he has since had c-spine surgery in March 2021 and saw cardiology at [facility] December 2020. Allergies  is allergic to penicillins, adhesive tape, and seasonal.  Medications  Prior to Admission medications    Medication Sig Start Date End Date Taking?  Authorizing Provider   Cetirizine HCl (ALLERGY RELIEF) 10 MG CAPS Take 10 mg by mouth daily    Yes Historical Provider, MD   lisinopril (PRINIVIL;ZESTRIL) 10 MG tablet Take 10 mg by mouth nightly   Yes Historical Provider, MD   metFORMIN (GLUCOPHAGE) 500 MG tablet Take 500 mg by mouth daily   Yes Historical Provider, MD   Cyanocobalamin (VITAMIN B 12 PO) Take 500 mg by mouth three times a week   Yes Historical Provider, MD   VITAMIN D PO Take 400 Units by mouth daily   Yes Historical Provider, MD   atorvastatin (LIPITOR) 40 MG tablet Take 40 mg by mouth nightly 12/20/19  Yes Historical Provider, MD   famotidine (PEPCID) 40 MG tablet Take 40 mg by mouth daily 7/28/20  Yes Historical Provider, MD   Insulin Degludec 200 UNIT/ML SOPN Inject 66 Units into the skin daily 11/20/19  Yes Historical Provider, MD   triamterene-hydroCHLOROthiazide (DYAZIDE) 37.5-25 MG per capsule Take 1 capsule by mouth daily 10/22/19  Yes Historical Provider, MD   VITAMIN A PO Take 1,000 mg by mouth daily   Yes Historical Provider, MD   calcium carbonate (OSCAL) 500 MG TABS tablet Take 1,000 mg by mouth 3 times daily   Yes Historical Provider, MD   Multiple Vitamins-Minerals (THERAPEUTIC MULTIVITAMIN-MINERALS) tablet Take 1 tablet by mouth daily   Yes Historical Provider, MD   aspirin 81 MG chewable tablet Take 81 mg by mouth daily    Historical Provider, MD   insulin lispro (HUMALOG) 100 UNIT/ML injection cartridge Inject 5 Units into the skin 2 times daily as needed for Other For blood sugar greater than 250    Historical Provider, MD   albuterol sulfate  (90 Base) MCG/ACT inhaler Inhale 2 actuation into the lungs as needed    Historical Provider, MD     Past Medical History    has a past medical history of Asthma, Blurred vision, left eye, CAD (coronary artery disease), Cardiac murmur, COVID-19, Diabetes mellitus ([address] GERD (gastroesophageal reflux disease), [place] (hard of hearing), Hyperlipidemia, Hypertension, Neuropathy, PVC's (premature ventricular contractions), PVD (peripheral vascular disease) ([facility] Sleep apnea, Vision problem, and Wellness examination. Past Surgical History   has a past surgical history that includes Gastric bypass surgery (? ); Coronary artery bypass graft (); Colonoscopy; Cataract removal with implant (Left, 2020); Cardiac surgery; cervical fusion (2021); and cardiovascular stress test (2020). Social History   reports that he has never smoked. He has never used smokeless tobacco.   reports no history of alcohol use. reports no history of drug use. Marital Status    Children   Occupation GM  Family History  Family Status   Relation Name Status    Mother     Blase Liming Father       family history includes Diabetes in his father and mother; Heart Disease in his father and mother.     Review of Systems:  CONSTITUTIONAL:   negative for fevers, chills, fatigue and malaise    EYES:   +decreased vision   HEENT:   negative for tinnitus, epistaxis and sore throat     RESPIRATORY:   negative for cough, shortness of breath, wheezing     CARDIOVASCULAR:   negative for chest pain, palpitations, syncope

## 2021-09-22 NOTE — ANESTHESIA PRE PROCEDURE
Department of Anesthesiology  Preprocedure Note       Name:  Mabel Ponce   Age:  78 y.o.  :  1955                                          MRN:  [id number]         Date:  2021      Surgeon: Margarita Jimenez):  Meghan Mccarty MD    Procedure: Procedure(s):  PARS PLANA VITRECTOMY 25 GAUGE, MEMBRANE PEEL, ENDOLASER, KENALOG INJECTION, ICG    Medications prior to admission:   Prior to Admission medications    Medication Sig Start Date End Date Taking?  Authorizing Provider   Cetirizine HCl (ALLERGY RELIEF) 10 MG CAPS Take 10 mg by mouth daily    Yes Historical Provider, MD   lisinopril (PRINIVIL;ZESTRIL) 10 MG tablet Take 10 mg by mouth nightly   Yes Historical Provider, MD   metFORMIN (GLUCOPHAGE) 500 MG tablet Take 500 mg by mouth daily   Yes Historical Provider, MD   Cyanocobalamin (VITAMIN B 12 PO) Take 500 mg by mouth three times a week   Yes Historical Provider, MD   VITAMIN D PO Take 400 Units by mouth daily   Yes Historical Provider, MD   atorvastatin (LIPITOR) 40 MG tablet Take 40 mg by mouth nightly 19  Yes Historical Provider, MD   famotidine (PEPCID) 40 MG tablet Take 40 mg by mouth daily 20  Yes Historical Provider, MD   Insulin Degludec 200 UNIT/ML SOPN Inject 66 Units into the skin daily 19  Yes Historical Provider, MD   triamterene-hydroCHLOROthiazide (DYAZIDE) 37.5-25 MG per capsule Take 1 capsule by mouth daily 10/22/19  Yes Historical Provider, MD   VITAMIN A PO Take 1,000 mg by mouth daily   Yes Historical Provider, MD   calcium carbonate (OSCAL) 500 MG TABS tablet Take 1,000 mg by mouth 3 times daily   Yes Historical Provider, MD   Multiple Vitamins-Minerals (THERAPEUTIC MULTIVITAMIN-MINERALS) tablet Take 1 tablet by mouth daily   Yes Historical Provider, MD   aspirin 81 MG chewable tablet Take 81 mg by mouth daily    Historical Provider, MD   insulin lispro (HUMALOG) 100 UNIT/ML injection cartridge Inject 5 Units into the skin 2 times daily as needed for Other For blood sugar greater than 250    Historical Provider, MD   albuterol sulfate  (90 Base) MCG/ACT inhaler Inhale 2 actuation into the lungs as needed    Historical Provider, MD       Current medications:    Current Facility-Administered Medications   Medication Dose Route Frequency Provider Last Rate Last Admin    tropicamide (MYDRIACYL) 1 % ophthalmic solution 1 drop  1 drop Left Eye Q15 Min PRN Roberto Gil MD        phenylephrine (AFTAB-SYNEPHRINE) 10 % ophthalmic solution 1 drop  1 drop Left Eye Q15 Min PRN Roberto Gil MD        cyclopentolate (CYCLOGYL) 1 % ophthalmic solution 1 drop  1 drop Left Eye Q15 Min PRN Roberto Gil MD        ofloxacin (OCUFLOX) 0.3 % solution 1 drop  1 drop Left Eye Q15 Min PRN Roberto Gil MD           Allergies: Allergies   Allergen Reactions    Penicillins Hives    Adhesive Tape Other (See Comments)     Ok on arms per pt but tears skin on legs    Seasonal Other (See Comments)     congestion       Problem List:  There is no problem list on file for this patient. Past Medical History:        Diagnosis Date    Asthma     Blurred vision, left eye     CAD (coronary artery disease)     CABG 2011, does not follow with cardiology    Diabetes mellitus ([facility] [address]     GERD (gastroesophageal reflux disease)     [place]     has Bilat hearing aids but does not ususally wear them    Hyperlipidemia     Hypertension     managed by Lisa Anderson    Neuropathy     feet    PVD (peripheral vascular disease) ([facility] [address]     Sleep apnea     uses cpap with oxygen 2L    Vision problem     left eye    Wellness examination 09/16/2021    PCP Latonya Cadet [facility] in [place] manages everything       Past Surgical History:        Procedure Laterality Date    CARDIAC SURGERY      CATARACT REMOVAL WITH IMPLANT Left 08/26/2020    CERVICAL FUSION  03/2021    COLONOSCOPY      CORONARY ARTERY BYPASS GRAFT  2011    x 3    GASTRIC BYPASS SURGERY  ? 2010    Dr. Katheren Lennox History:    Social History     Tobacco Use    Smoking status: Never Smoker    Smokeless tobacco: Never Used   Substance Use Topics    Alcohol use: Never                                Counseling given: Not Answered      Vital Signs (Current):   Vitals:    09/17/21 1413   Weight: 265 lb (120.2 kg)   Height: 6' (1.829 m)                                              BP Readings from Last 3 Encounters:   10/14/20 (!) 159/50   10/14/20 (!) 179/80       NPO Status:                                                                                 BMI:   Wt Readings from Last 3 Encounters:   09/17/21 265 lb (120.2 kg)   10/14/20 275 lb 9.2 oz (125 kg)     Body mass index is 35.94 kg/m². CBC: No results found for: WBC, RBC, HGB, HCT, MCV, RDW, PLT    CMP:   Lab Results   Component Value Date    CREATININE 0.77 10/14/2020    LABGLOM >60 10/14/2020       POC Tests: No results for input(s): POCGLU, POCNA, POCK, POCCL, POCBUN, POCHEMO, POCHCT in the last 72 hours.     Coags: No results found for: PROTIME, INR, APTT    HCG (If Applicable): No results found for: PREGTESTUR, PREGSERUM, HCG, HCGQUANT     ABGs: No results found for: PHART, PO2ART, VQL3EVC, VWC1OLO, BEART, V6ACLVPE     Type & Screen (If Applicable):  No results found for: LABABO, LABRH    Drug/Infectious Status (If Applicable):  No results found for: HIV, HEPCAB    COVID-19 Screening (If Applicable):   Lab Results   Component Value Date    COVID19 Not Detected 10/10/2020           Anesthesia Evaluation  Patient summary reviewed and Nursing notes reviewed no history of anesthetic complications:   Airway: Mallampati: I  TM distance: >3 FB   Neck ROM: limited  Mouth opening: > = 3 FB Dental: normal exam         Pulmonary:normal exam    (+) sleep apnea: on CPAP,  asthma:                            Cardiovascular:    (+) hypertension:, CAD:, CABG/stent:, hyperlipidemia        Rhythm: irregular  Rate: normal Neuro/Psych:   (+) neuromuscular disease (neuropathy of feet):,             GI/Hepatic/Renal:   (+) GERD:,           Endo/Other:    (+) DiabetesType II DM, using insulin, . Abdominal:             Vascular: Other Findings:           Anesthesia Plan      MAC     ASA 3       Induction: intravenous. MIPS: Postoperative opioids intended and Prophylactic antiemetics administered. Anesthetic plan and risks discussed with patient. Plan discussed with CRNA.                   Kaley Chin MD   9/22/2021

## 2021-09-22 NOTE — ANESTHESIA POSTPROCEDURE EVALUATION
Department of Anesthesiology  Postprocedure Note    Patient: Blu Ross  MRN: [id number]  YOB: 1955  Date of evaluation: 9/22/2021  Time:  2:43 PM     Procedure Summary     Date: 09/22/21 Room / Location: [address]    Anesthesia Start: 9335 Anesthesia Stop: 7266    Procedure: PARS PLANA VITRECTOMY 25 GAUGE, MEMBRANE PEEL, ENDOLASER 200   SPOTS, AIR FLUID EXCHANGE, AIR GAS EXHANGE WITH 20% SF6, KENALOG INJECTION, ICG (Left Eye) Diagnosis: (TRACTION RETINAL DETACHMENT)    Surgeons: Keshav Engle MD Responsible Provider: Ceasar Chavez MD    Anesthesia Type: MAC ASA Status: 3          Anesthesia Type: MAC    Bill Phase I:      Bill Phase II: Bill Score: 10    Last vitals: Reviewed and per EMR flowsheets.        Anesthesia Post Evaluation    Patient location during evaluation: PACU  Patient participation: complete - patient participated  Level of consciousness: awake and alert  Pain score: 0  Airway patency: patent  Nausea & Vomiting: no nausea and no vomiting  Complications: no  Cardiovascular status: hemodynamically stable  Respiratory status: room air  Hydration status: euvolemic

## 2021-09-22 NOTE — BRIEF OP NOTE
Brief Postoperative Note      Patient: Chely Mueller  YOB: 1955  MRN: [id number]    Date of Procedure: 9/22/2021    Pre-Op Diagnosis: TRACTION RETINAL DETACHMENT    Post-Op Diagnosis: Same       Procedure(s):  PARS PLANA VITRECTOMY 25 GAUGE, MEMBRANE PEEL, ENDOLASER 200   SPOTS, AIR FLUID EXCHANGE, AIR GAS EXHANGE WITH 20% SF6, KENALOG INJECTION, ICG    Surgeon(s):  Jordan Yi MD    Assistant:  * No surgical staff found *    Anesthesia: Monitor Anesthesia Care    Estimated Blood Loss (mL): Minimal    Complications: None    Specimens:   * No specimens in log *    Implants:  * No implants in log *      Drains: * No LDAs found *    Findings:     Electronically signed by Jordan Yi MD on 9/22/2021 at 11:36 AM

## 2021-09-23 LAB
EKG ATRIAL RATE: 64 BPM
EKG P AXIS: 59 DEGREES
EKG P-R INTERVAL: 170 MS
EKG Q-T INTERVAL: 410 MS
EKG QRS DURATION: 110 MS
EKG QTC CALCULATION (BAZETT): 422 MS
EKG R AXIS: -40 DEGREES
EKG T AXIS: 18 DEGREES
EKG VENTRICULAR RATE: 64 BPM

## 2021-09-23 PROCEDURE — 93010 ELECTROCARDIOGRAM REPORT: CPT | Performed by: INTERNAL MEDICINE

## 2021-09-23 NOTE — FLOWSHEET NOTE
Pt answered phone and states he will be following up with his doctor today and he was satisfied with his recovery care on 9/22/21.

## 2021-09-23 NOTE — OP NOTE
[facility]                                OPERATIVE REPORT    PATIENT NAME: Ekaterina Andrade                       :        1955  MED REC NO:   [id number]                             ROOM:  ACCOUNT NO:   [de-identified]                           ADMIT DATE: 2021  PROVIDER:     Lorenzo Romero    DATE OF PROCEDURE:  2021    SURGEON:  Lorenzo Nance. Jignesh Romero MD    PREOPERATIVE DIAGNOSIS:  Left diabetic traction retinal detachment. POSTOPERATIVE DIAGNOSIS:  Left diabetic traction retinal detachment. PROCEDURES:  Left pars plana vitrectomy, membrane stripping, endolaser,  air-fluid-gas exchange. COMPLICATIONS:  None. ANESTHETIC:  MAC. BLOOD LOSS:  Zero. INDICATIONS FOR SURGERY:  The above patient is a poorly controlled  diabetic with numerous medical problems secondary to diabetes including  cardiovascular problems. The patient was scheduled for surgery in the  past, but surgery had to be canceled due to medical problems on  anesthesia. He now has cardiac clearance. The patient has a very  severe traction retinal detachment with severe traction on the macula. We do not know the level of retinal perfusion. We do not know the level  of macular perfusion, although it is affected by the diabetic  retinopathy. I reviewed all risks and benefits of the surgery with the  patient. He understands the future prognosis is guarded, and there is a  risk of recurrent detachment, PVR, glaucoma, neovascular glaucoma,  chronic macular edema, failure of vision to improve, macular ischemia,  loss of vision, glaucoma, operative neuropathy, loss of eye. The  patient understood these risks and the potential for further surgery. OPERATIVE PROCEDURE:  After all informed consent was obtained, the  patient was taken to the operating room.   A total of 8 mL of 2%  lidocaine was given with 0.75% Marcaine in a 50:50 mixture without  epinephrine. The patient's left eye was draped and prepped in the usual  sterile fashion. In anticipation for a 25-gauge surgery, Betadine was  placed in the conjunctiva as prophylaxis against infection. Measuring  3.5 mm posterior to the limbus in the inferotemporal quadrant, the  conjunctiva was deflected towards the cornea, followed by a 25-gauge  trocar, followed by infusion cannula, which was hooked in place. Care  was taken to ensure the infusion cannula was seen in the vitreous cavity  prior to turning the infusion cannula on. It was then turned onto 35. Similar maneuver was done superotemporally and superonasally by placing  trocars, 3.5 mm posterior to the limbus. Ocutome and light pipe were  placed in the vitreous cavity. Core vitrectomy was carried out. There  was no PVD over the posterior pole. I induced PVD temporarily and then  removed the posterior hyaloid using the cutter all the way inferiorly  nasally, disconnecting the posterior hyaloid superiorly from the dense  fibrovascular membrane over the macula and optic nerve. Once this was  done, I engaged the epiretinal membrane and started to engage  fibrovascular membrane with the ocutome and the ILM forceps different  times, and I elevated off completely one sheath. It was very adherently  attached along the superior arcades to a major retinal vessel and no  recurrent bleeding and therefore I debulked the tissue and then used  curved _____ scissors to segment it off and with extraction of the  vessel, and then I removed the complete membrane in toto with a cutter. All residual posterior hyaloid was removed with the ocutome, and then I  placed several drops of concentrated Kenalog into the vitreous cavity  and then I was able to remove all residual posterior hyaloid, especially  nasally to relieve all traction on the retina.   Then, once this was  done, exam of the posterior pole of contact lens revealed an epiretinal  membrane yayo the macula and then this was removed by placing  several drops of ICG and D5W mixture using 0.1% solution and then  allowed to stain for 5 seconds and then removed with silicone-tipped  extrusion needle. I then scratched down in the ILM with a Matthew membrane  scraper, and I removed the complete ILM and the ERM off the macula. All  traction was relieved. At this point, I used endolaser to finish off  areas of denuded PRP. There was an area along the superior arcade just  above it that had subretinal fluid, and this appeared to be an occult  retinal break and therefore I did an air-fluid exchange with  silicone-tipped extrusion needle and did subretinal fluid removal  through the superior retinal break. Several rounds of laser were placed  around this to prevent postoperative RD. At this point, a repeat  air-fluid exchange was carried out and then I injected 45 mL of 20% SF6  gas, vented out with a 27-gauge needle through the trocar site. Then,  the two superior trocar sites were removed. These were airtight. The  infusion cannula was removed and this leaked. I closed that with a 6-0  gut sutures. Digital palpation confirmed the IOP was normal.   Subconjunctival vancomycin was given superiorly and inferiorly. Antibiotic ointment was placed and the eye patched and shielded. The  patient returned to the recovery room in satisfactory condition with  instructions for head positioning until the following day.         Brennan Rodarte    D: 09/22/2021 22:33:40       T: 09/23/2021 1:08:45     [id number]  Job#: [id number]     Doc#: [id number]    CC:

## 2021-09-23 NOTE — FLOWSHEET NOTE
Pt states his recovery is going very well and has follow-up today and was satisfied with recovery care at PACU and does not need any additional information.

## (undated) DEVICE — CAUTERY BPLR 25GA INTOCU W/ HNDPC CRD DISP FOR CX9404

## (undated) DEVICE — 25GA EZPASS SOFT TIP CANNULA BOX OF 5: Brand: VORTEX SURGICAL INC

## (undated) DEVICE — 25 GA FLEXIBLE TIP LASER PROBE: Brand: ALCON, ENGAUGE

## (undated) DEVICE — 1 EACH 40411 STERILE DISPOSABLE SUPER VIEW® LENS SET & 1 EACH 40100 STERILE MICROSCOPE DRAPE: Brand: SUPER VIEW® PACK

## (undated) DEVICE — RETINA PK

## (undated) DEVICE — SOLUTION SCRB 4OZ 10% POVIDONE IOD ANTIMIC BTL

## (undated) DEVICE — SYRINGE MED 50ML LUERLOCK TIP

## (undated) DEVICE — AGENT VISCOELASTIC 1ML 2% HYDROXYPROPYL METHCELL PRELD GLS

## (undated) DEVICE — NEEDLE HYPO 27GA L0.5IN GRY POLYPR HUB S STL REG BVL STR

## (undated) DEVICE — STANDARD HYPODERMIC NEEDLE,ALUMINUM HUB: Brand: MONOJECT

## (undated) DEVICE — NEEDLE HYPO 30GA L0.5IN BGE POLYPR HUB S STL REG BVL STR

## (undated) DEVICE — 25GA RETRACTABLE ILM MEMBRANE ELEVATOR BOX OF 5: Brand: VORTEX SURGICAL INC

## (undated) DEVICE — SYRINGE ST FILTERS W/MCE MEMBRAN

## (undated) DEVICE — SYRINGE MED 3ML CLR PLAS STD N CTRL LUERLOCK TIP DISP

## (undated) DEVICE — SYRINGE MED 1ML POLYCARB LUERLOCK HUB

## (undated) DEVICE — SURGICAL PROCEDURE PACK 25 GA VITRECTOMY

## (undated) DEVICE — REVOLUTION DSP 25G CURVED SCISSORS: Brand: ALCON GRIESHABER REVOLUTION

## (undated) DEVICE — SOLUTION IRRIG 1000ML PENTALYTE PLAS POUR BTL TIS U SOL

## (undated) DEVICE — OCCUCOAT SYRINGE 1ML 6PK: Brand: OCCUCOAT

## (undated) DEVICE — DISPOSABLE BIPOLAR CABLE, 12FT (3.6M): Brand: KIRWAN

## (undated) DEVICE — DRESSING TRNSPAR W2XL2.75IN FLM SHT SEMIPERMEABLE WIND

## (undated) DEVICE — 25 GAUGE ENTRY SYSTEM, ENHANCED, 3 EA: Brand: ALCON

## (undated) DEVICE — GLOVE ORANGE PI 7 1/2   MSG9075

## (undated) DEVICE — KIT,ANTI FOG,W/SPONGE & FLUID,SOFT PACK: Brand: MEDLINE

## (undated) DEVICE — HYPODERMIC SAFETY NEEDLE: Brand: MAGELLAN

## (undated) DEVICE — SYRINGE MED 10ML LUERLOCK TIP W/O SFTY DISP

## (undated) DEVICE — SOLUTION PREP POVIDONE IOD FOR SKIN MUCOUS MEM PRIOR TO

## (undated) DEVICE — OCCLUDER EYE POLYETH HYPOALRG ADH TAPE W/O PINHOLE

## (undated) DEVICE — 25 GA VALVED ENTRY SYSTEM, 3-CT: Brand: ALCON

## (undated) DEVICE — MICROSURGICAL INSTRUMENT 25GA SOFT TIP NEEDLE: Brand: ALCON

## (undated) DEVICE — REVOLUTION DSP 25G ILM FORCEPS: Brand: ALCON GRIESHABER REVOLUTION

## (undated) DEVICE — 25 GA TROCAR PLUGS: Brand: ALCON